# Patient Record
Sex: FEMALE | Race: WHITE | NOT HISPANIC OR LATINO | URBAN - METROPOLITAN AREA
[De-identification: names, ages, dates, MRNs, and addresses within clinical notes are randomized per-mention and may not be internally consistent; named-entity substitution may affect disease eponyms.]

---

## 2018-10-30 ENCOUNTER — OFFICE (OUTPATIENT)
Dept: URBAN - METROPOLITAN AREA CLINIC 45 | Facility: CLINIC | Age: 58
End: 2018-10-30

## 2018-10-30 VITALS
WEIGHT: 198 LBS | DIASTOLIC BLOOD PRESSURE: 89 MMHG | HEIGHT: 62 IN | HEART RATE: 83 BPM | SYSTOLIC BLOOD PRESSURE: 125 MMHG

## 2018-10-30 DIAGNOSIS — K59.01 CONSTIPATION, SLOW TRANSIT: ICD-10-CM

## 2018-10-30 DIAGNOSIS — Z12.11 SCREENING FOR COLONIC NEOPLASIA: ICD-10-CM

## 2018-10-30 DIAGNOSIS — E66.9 OBESITY: ICD-10-CM

## 2018-10-30 PROCEDURE — 99203 OFFICE O/P NEW LOW 30 MIN: CPT | Performed by: INTERNAL MEDICINE

## 2018-10-30 NOTE — SERVICEHPINOTES
Patient presents for a screening colonoscopy. There has been no previous colon screening. The patient has no family history of colon cancer or other significant GI diseases. Patient reports long-standing constipation, usually relieved by taking OTC senna-based laxative to 3 times a week. She denies fever, nausea, vomiting, dysphagia, reflux, abdominal pain, change in bowel habits, diarrhea, rectal bleeding, melena, and significant change in weight. Denies shortness of breath and chest pain. The patient recalls having chronic anal fissure.

## 2018-11-13 ENCOUNTER — AMBULATORY SURGICAL CENTER (OUTPATIENT)
Dept: URBAN - METROPOLITAN AREA SURGERY 28 | Facility: SURGERY | Age: 58
End: 2018-11-13

## 2018-11-13 VITALS
DIASTOLIC BLOOD PRESSURE: 85 MMHG | WEIGHT: 198 LBS | HEIGHT: 62 IN | HEART RATE: 70 BPM | OXYGEN SATURATION: 98 % | TEMPERATURE: 96 F | SYSTOLIC BLOOD PRESSURE: 148 MMHG | RESPIRATION RATE: 18 BRPM

## 2018-11-13 DIAGNOSIS — D12.5 BENIGN NEOPLASM OF SIGMOID COLON: ICD-10-CM

## 2018-11-13 DIAGNOSIS — Z12.11 ENCOUNTER FOR SCREENING FOR MALIGNANT NEOPLASM OF COLON: ICD-10-CM

## 2018-11-13 LAB — SURGICAL: PDF REPORT: (no result)

## 2018-11-13 PROCEDURE — 45380 COLONOSCOPY AND BIOPSY: CPT | Performed by: INTERNAL MEDICINE

## 2018-12-23 ENCOUNTER — HOSPITAL ENCOUNTER (INPATIENT)
Dept: HOSPITAL 54 - ER | Age: 58
LOS: 2 days | Discharge: HOME | DRG: 74 | End: 2018-12-25
Attending: NURSE PRACTITIONER | Admitting: NURSE PRACTITIONER
Payer: COMMERCIAL

## 2018-12-23 VITALS — BODY MASS INDEX: 36.07 KG/M2 | HEIGHT: 62 IN | WEIGHT: 196 LBS

## 2018-12-23 DIAGNOSIS — E86.0: ICD-10-CM

## 2018-12-23 DIAGNOSIS — I10: ICD-10-CM

## 2018-12-23 DIAGNOSIS — E66.01: ICD-10-CM

## 2018-12-23 DIAGNOSIS — E44.1: ICD-10-CM

## 2018-12-23 DIAGNOSIS — A08.4: ICD-10-CM

## 2018-12-23 DIAGNOSIS — G47.33: ICD-10-CM

## 2018-12-23 DIAGNOSIS — G90.8: Primary | ICD-10-CM

## 2018-12-23 DIAGNOSIS — D72.829: ICD-10-CM

## 2018-12-23 DIAGNOSIS — K21.9: ICD-10-CM

## 2018-12-23 LAB
ALBUMIN SERPL BCP-MCNC: 3.3 G/DL (ref 3.4–5)
ALP SERPL-CCNC: 91 U/L (ref 46–116)
ALT SERPL W P-5'-P-CCNC: 18 U/L (ref 12–78)
AST SERPL W P-5'-P-CCNC: 11 U/L (ref 15–37)
BASOPHILS # BLD AUTO: 0.1 /CMM (ref 0–0.2)
BASOPHILS NFR BLD AUTO: 0.4 % (ref 0–2)
BILIRUB DIRECT SERPL-MCNC: 0 MG/DL (ref 0–0.2)
BILIRUB SERPL-MCNC: 0.3 MG/DL (ref 0.2–1)
BUN SERPL-MCNC: 17 MG/DL (ref 7–18)
CALCIUM SERPL-MCNC: 8.9 MG/DL (ref 8.5–10.1)
CHLORIDE SERPL-SCNC: 105 MMOL/L (ref 98–107)
CO2 SERPL-SCNC: 27 MMOL/L (ref 21–32)
CREAT SERPL-MCNC: 0.7 MG/DL (ref 0.6–1.3)
EOSINOPHIL NFR BLD AUTO: 0.5 % (ref 0–6)
GLUCOSE SERPL-MCNC: 143 MG/DL (ref 74–106)
HCT VFR BLD AUTO: 41 % (ref 33–45)
HGB BLD-MCNC: 13.7 G/DL (ref 11.5–14.8)
LYMPHOCYTES NFR BLD AUTO: 0.7 /CMM (ref 0.8–4.8)
LYMPHOCYTES NFR BLD AUTO: 5.9 % (ref 20–44)
MCHC RBC AUTO-ENTMCNC: 34 G/DL (ref 31–36)
MCV RBC AUTO: 89 FL (ref 82–100)
MONOCYTES NFR BLD AUTO: 0.7 /CMM (ref 0.1–1.3)
MONOCYTES NFR BLD AUTO: 5.6 % (ref 2–12)
NEUTROPHILS # BLD AUTO: 11 /CMM (ref 1.8–8.9)
NEUTROPHILS NFR BLD AUTO: 87.6 % (ref 43–81)
PLATELET # BLD AUTO: 348 /CMM (ref 150–450)
POTASSIUM SERPL-SCNC: 4.3 MMOL/L (ref 3.5–5.1)
PROT SERPL-MCNC: 7.7 G/DL (ref 6.4–8.2)
RBC # BLD AUTO: 4.54 MIL/UL (ref 4–5.2)
SODIUM SERPL-SCNC: 142 MMOL/L (ref 136–145)
WBC NRBC COR # BLD AUTO: 12.5 K/UL (ref 4.3–11)

## 2018-12-23 PROCEDURE — Z7610: HCPCS

## 2018-12-23 PROCEDURE — G0378 HOSPITAL OBSERVATION PER HR: HCPCS

## 2018-12-23 PROCEDURE — A4606 OXYGEN PROBE USED W OXIMETER: HCPCS

## 2018-12-23 NOTE — NUR
PT BIB PA. COMP OF HAVING A "SYNCOPAL EPISODE FOR 1 MINUTE. +N/V" NO SOB NOTED 
AT THIS TIME. NO ACUTE DISTRESS AT THIS TIME. AWAITING MD ADAMS.

## 2018-12-24 VITALS — SYSTOLIC BLOOD PRESSURE: 127 MMHG | DIASTOLIC BLOOD PRESSURE: 76 MMHG

## 2018-12-24 VITALS — SYSTOLIC BLOOD PRESSURE: 114 MMHG | DIASTOLIC BLOOD PRESSURE: 60 MMHG

## 2018-12-24 VITALS — DIASTOLIC BLOOD PRESSURE: 59 MMHG | SYSTOLIC BLOOD PRESSURE: 124 MMHG

## 2018-12-24 VITALS — SYSTOLIC BLOOD PRESSURE: 159 MMHG | DIASTOLIC BLOOD PRESSURE: 72 MMHG

## 2018-12-24 VITALS — SYSTOLIC BLOOD PRESSURE: 116 MMHG | DIASTOLIC BLOOD PRESSURE: 62 MMHG

## 2018-12-24 VITALS — SYSTOLIC BLOOD PRESSURE: 141 MMHG | DIASTOLIC BLOOD PRESSURE: 65 MMHG

## 2018-12-24 VITALS — SYSTOLIC BLOOD PRESSURE: 118 MMHG | DIASTOLIC BLOOD PRESSURE: 79 MMHG

## 2018-12-24 VITALS — SYSTOLIC BLOOD PRESSURE: 128 MMHG | DIASTOLIC BLOOD PRESSURE: 80 MMHG

## 2018-12-24 RX ADMIN — SUCRALFATE SCH G: 1 SUSPENSION ORAL at 13:06

## 2018-12-24 RX ADMIN — ACETAMINOPHEN PRN MG: 325 TABLET ORAL at 19:07

## 2018-12-24 RX ADMIN — ACETAMINOPHEN PRN MG: 325 TABLET ORAL at 05:21

## 2018-12-24 RX ADMIN — SUCRALFATE SCH G: 1 SUSPENSION ORAL at 23:42

## 2018-12-24 RX ADMIN — SODIUM CHLORIDE PRN MLS/HR: 9 INJECTION, SOLUTION INTRAVENOUS at 14:41

## 2018-12-24 RX ADMIN — SUCRALFATE SCH G: 1 SUSPENSION ORAL at 18:04

## 2018-12-24 NOTE — NUR
PT IN BED RESTING COMFORTABLY. BREATHING EVENLY. NO ACUTE EVENT DURING THE NIGHT. SR ON TELE 
MONITOR, NEEDS ATTENDED. ASSISTED W/ ADLS . CALL LIGHT WITHIN REACH. BED LOW LOCKED. WILL 
CONT TO MONITOR AND WILL ENDORSE TO AM SHIFT FOR EMELIA.

## 2018-12-24 NOTE — NUR
RN OPENING NOTES



PT RECEIVED IN BED AT LOWEST AND LOCKED POSITION WITH SIDE RAILS UP X2, A/O X3, BREATHING 
EVEN AND UNLABORED ON RA, NO S/S OF PAIN OR DISTRESS NOTED AT THIS TIME, IV IS PATENT AND 
INTACT,  PRESENT AT THE BEDSIDE, SAFETY PRECAUTIONS IN PLACE, CALL LIGHT WITHIN 
REACH, WILL MONITOR ACCORDINGLY

## 2018-12-24 NOTE — NUR
RN NOTES



PT COMPLAINED OF SLIGHT HEARTBURN AFTER EATING LUNCH, REQUESTED TUMS. DR. ARRIAGA NOTIFIED OF 
PT REQUEST AND HE ORDERED CARAFATE LIQUID Q6H PO, WILL IMPLEMENT ORDERS

## 2018-12-24 NOTE — NUR
RN CLOSING NOTES



PT IN BED AT LOWEST AND LOCKED POSITION WITH SIDE RAILS UP X2, A/O X3, BREATHING EVEN AND 
UNLABORED ON RA, NO S/S OF PAIN OR DISTRESS NOTED AT THIS TIME, IV IS PATENT AND INTACT, 
FAMILY PRESENT AT THE BEDSIDE, SAFETY PRECAUTIONS IN PLACE, CALL LIGHT WITHIN REACH, ALL 
NEEDS ATTENDED TO, WILL ENDORSE TO NIGHT SHIFT FOR CONTINUITY OF CARE

## 2018-12-24 NOTE — NUR
MS/RN OPENING NOTES



PT RECEIVED AWAKE, RESTING COMFORTABLY IN BED.  AT BEDSIDE. ON ROOM AIR, BREATHING 
EVEN AND UNLABORED. DENIES SOB BUT NOTES HEADACHE AND REQUESTING TYLENOL. IV TO LEFT HAND 
PATENT AND INTACT RUNNING IVF AS ORDERED. BED IN LOW/LOCKED POSITION WITH CALL LIGHT IN 
REACH AND BILATERAL UPPER SIDE RAILS IN PLACE. WILL CONTINUE TO MONITOR

## 2018-12-24 NOTE — NUR
RECEIVED PT FROM ER IN STABLE CONDITION. A, OX4. BREATHING EVENLY. NO SOB. NAD. W/ C/O 
LIGHTHEADEDNESS. ON ONGOING IV NS BOLUS. IV SITE INTACT AND PATENT. ALL MEDICAL INFO WAS 
OBTAINED FROM THE PT. VITAL SIGNS OBTAINED. NEEDS ATTENDED, BED LOW LOCKED. CALL LIGHT 
WITHIN REACH, WILL CONT TO MONITOR AND WILL F/U WITH MD'S ORDERS.

## 2018-12-25 VITALS — SYSTOLIC BLOOD PRESSURE: 124 MMHG | DIASTOLIC BLOOD PRESSURE: 71 MMHG

## 2018-12-25 LAB
BASOPHILS # BLD AUTO: 0 /CMM (ref 0–0.2)
BASOPHILS NFR BLD AUTO: 0.5 % (ref 0–2)
BUN SERPL-MCNC: 5 MG/DL (ref 7–18)
CALCIUM SERPL-MCNC: 8.3 MG/DL (ref 8.5–10.1)
CHLORIDE SERPL-SCNC: 108 MMOL/L (ref 98–107)
CHOLEST SERPL-MCNC: 140 MG/DL (ref ?–200)
CO2 SERPL-SCNC: 27 MMOL/L (ref 21–32)
CREAT SERPL-MCNC: 0.5 MG/DL (ref 0.6–1.3)
EOSINOPHIL NFR BLD AUTO: 2.4 % (ref 0–6)
GLUCOSE SERPL-MCNC: 90 MG/DL (ref 74–106)
HCT VFR BLD AUTO: 36 % (ref 33–45)
HDLC SERPL-MCNC: 40 MG/DL (ref 40–60)
HGB BLD-MCNC: 12.2 G/DL (ref 11.5–14.8)
LDLC SERPL DIRECT ASSAY-MCNC: 80 MG/DL (ref 0–99)
LYMPHOCYTES NFR BLD AUTO: 1.6 /CMM (ref 0.8–4.8)
LYMPHOCYTES NFR BLD AUTO: 30.2 % (ref 20–44)
MAGNESIUM SERPL-MCNC: 2 MG/DL (ref 1.8–2.4)
MCHC RBC AUTO-ENTMCNC: 34 G/DL (ref 31–36)
MCV RBC AUTO: 89 FL (ref 82–100)
MONOCYTES NFR BLD AUTO: 0.6 /CMM (ref 0.1–1.3)
MONOCYTES NFR BLD AUTO: 11 % (ref 2–12)
NEUTROPHILS # BLD AUTO: 2.9 /CMM (ref 1.8–8.9)
NEUTROPHILS NFR BLD AUTO: 55.9 % (ref 43–81)
PHOSPHATE SERPL-MCNC: 2.8 MG/DL (ref 2.5–4.9)
PLATELET # BLD AUTO: 295 /CMM (ref 150–450)
POTASSIUM SERPL-SCNC: 3.7 MMOL/L (ref 3.5–5.1)
RBC # BLD AUTO: 3.99 MIL/UL (ref 4–5.2)
SODIUM SERPL-SCNC: 144 MMOL/L (ref 136–145)
TRIGL SERPL-MCNC: 128 MG/DL (ref 30–150)
WBC NRBC COR # BLD AUTO: 5.2 K/UL (ref 4.3–11)

## 2018-12-25 RX ADMIN — DEXTROSE MONOHYDRATE PRN MG: 50 INJECTION, SOLUTION INTRAVENOUS at 06:38

## 2018-12-25 RX ADMIN — SUCRALFATE SCH G: 1 SUSPENSION ORAL at 12:01

## 2018-12-25 RX ADMIN — SODIUM CHLORIDE PRN MLS/HR: 9 INJECTION, SOLUTION INTRAVENOUS at 12:09

## 2018-12-25 RX ADMIN — DEXTROSE MONOHYDRATE PRN MG: 50 INJECTION, SOLUTION INTRAVENOUS at 12:09

## 2018-12-25 RX ADMIN — SUCRALFATE SCH G: 1 SUSPENSION ORAL at 07:25

## 2018-12-25 NOTE — NUR
MS RN OPENING NOTE



RECEIVED PATIENT IN BED. ALERT ORIENTED X4. ON ROOM AIR TOLERATING WELL . IN NO APPARENT 
DISTRESS OR DISCOMFORT AT THIS TIME. RESPIRATIONS EVEN AND UNLABORED. DENIES PAIN AND SOB. 
PATIENT IS ABLE TO COMMUNICATE NEEDS. REPORTS NAUSEA BUT NO VOMITING SINCE ANTINAUSEA 
MEDICATION WAS ADMINISTERED. LEFT HAND 20G IVC SL. PATENT AND INTACT. ALL NEEDS ATTENDED, 
KEPT CLEAN AND COMFORTABLE. SAFETY MEASURES IN PLACE, BED IN LOW LOCKED POSITION, SIDE RAILS 
UP X2, CALL LIGHT WITHIN EASY REACH WILL CONTINUE TO MONITOR.

## 2018-12-25 NOTE — NUR
MS/RN CLOSING NOTES



PT AWAKE,  AT BEDSIDE. ON ROOM AIR, BREATHING EVEN AND UNLABORED. DENIES SOB AND PAIN 
AT THIS TIME. WITH 5 EPISODE OF CLEAR WATERY EMESIS. ADMINISTERED PRN ZOFRAN AS ORDERED. IV 
TO LEFT HAND REMAINS PATENT AND INTACT. ASSISTED BACK INTO BED. NO SYNCOPAL EPISODES DURING 
SHIFT. NOTED WITH OCCASIONAL LIGHT HEADEDNESS UPON GETTING UP FROM THE BED. ALL NEEDS MET. 
KEPT PT AS COMFORTABLE AS POSSIBLE. BILATERAL UPPER SIDE RAILS IN PLACE. BED IN LOW/LOCKED 
POSITION WITH CALL LIGHT IN REACH. WILL ENDORSE TO DAY SHIFT RN EMELIA.

## 2019-11-12 NOTE — NUR
MS RN DISCHARGE NOTE



RECEIVED ORDER FOR DISCHARGE. PATIENT IS BEING DISCHARGED HOME WITH OUTPATIENT FOLLOW UP. 
PATIENT IS ALERT ORIENTED X4. MEDICALLY STABLE. VITAL SIGNS STABLE. NO RECENT NV. IN NO 
DISTRESS, DENIES PAIN.  DISCHARGE PREPARED VIA EXITCARE. REVIEWED WITH PATIENT. INSTRUCTIONS 
PROVIDED REGARDING NEW AND OLD MEDICATIONS, DISEASE PROCESS, FOLLOW UP CARE. PATIENT 
VERBALIZED UNDERSTANDING OF THE TEACHINGS. VALUABLES CHECKED AND ACCOUNTED FOR. NEW 
MEDICATION PRESCRIPTION WAS GIVEN TO PATIENT. VACCINATIONS OFFERED BUT REFUSED STATING SHE 
WILL RECEIVE IT AT HER PRIMARY DOCTOR'S OFFICE. SKIN REMAINS INTACT. ALL FORMS ARE SIGNED, 
COPIES MADE PLACED IN CHART. IV SITE DC-ED, TIP INTACT. ID BAND REMOVED. PATIENT LEFT THE 
UNIT ON A WHEELCHAIR, ACCOMPANIED BY HER  AND AYE KAHN. no